# Patient Record
Sex: MALE | Race: WHITE | NOT HISPANIC OR LATINO | Employment: FULL TIME | URBAN - METROPOLITAN AREA
[De-identification: names, ages, dates, MRNs, and addresses within clinical notes are randomized per-mention and may not be internally consistent; named-entity substitution may affect disease eponyms.]

---

## 2023-03-21 ENCOUNTER — APPOINTMENT (OUTPATIENT)
Dept: GENERAL RADIOLOGY | Facility: HOSPITAL | Age: 38
End: 2023-03-21
Payer: COMMERCIAL

## 2023-03-21 ENCOUNTER — HOSPITAL ENCOUNTER (EMERGENCY)
Facility: HOSPITAL | Age: 38
Discharge: HOME | End: 2023-03-22
Attending: EMERGENCY MEDICINE | Admitting: EMERGENCY MEDICINE
Payer: COMMERCIAL

## 2023-03-21 VITALS
HEART RATE: 60 BPM | WEIGHT: 177.47 LBS | SYSTOLIC BLOOD PRESSURE: 103 MMHG | DIASTOLIC BLOOD PRESSURE: 73 MMHG | BODY MASS INDEX: 28.52 KG/M2 | RESPIRATION RATE: 20 BRPM | OXYGEN SATURATION: 93 % | HEIGHT: 66 IN | TEMPERATURE: 97.9 F

## 2023-03-21 DIAGNOSIS — R07.9 CHEST PAIN, UNSPECIFIED TYPE: Primary | ICD-10-CM

## 2023-03-21 LAB
ALBUMIN SERPL-MCNC: 4.6 G/DL (ref 3.5–5.2)
ALBUMIN/GLOB SERPL: 1.7 G/DL
ALP SERPL-CCNC: 132 U/L (ref 39–117)
ALT SERPL W P-5'-P-CCNC: 26 U/L (ref 1–41)
ANION GAP SERPL CALCULATED.3IONS-SCNC: 12.3 MMOL/L (ref 5–15)
AST SERPL-CCNC: 23 U/L (ref 1–40)
BASOPHILS # BLD AUTO: 0.03 10*3/MM3 (ref 0–0.2)
BASOPHILS NFR BLD AUTO: 0.4 % (ref 0–1.5)
BILIRUB SERPL-MCNC: 0.6 MG/DL (ref 0–1.2)
BUN SERPL-MCNC: 18 MG/DL (ref 6–20)
BUN/CREAT SERPL: 20 (ref 7–25)
CALCIUM SPEC-SCNC: 9.4 MG/DL (ref 8.6–10.5)
CHLORIDE SERPL-SCNC: 102 MMOL/L (ref 98–107)
CO2 SERPL-SCNC: 28.7 MMOL/L (ref 22–29)
CREAT SERPL-MCNC: 0.9 MG/DL (ref 0.76–1.27)
DEPRECATED RDW RBC AUTO: 43.8 FL (ref 37–54)
EGFRCR SERPLBLD CKD-EPI 2021: 113.5 ML/MIN/1.73
EOSINOPHIL # BLD AUTO: 0.11 10*3/MM3 (ref 0–0.4)
EOSINOPHIL NFR BLD AUTO: 1.5 % (ref 0.3–6.2)
ERYTHROCYTE [DISTWIDTH] IN BLOOD BY AUTOMATED COUNT: 13.1 % (ref 12.3–15.4)
GEN 5 2HR TROPONIN T REFLEX: <6 NG/L
GLOBULIN UR ELPH-MCNC: 2.7 GM/DL
GLUCOSE SERPL-MCNC: 114 MG/DL (ref 65–99)
HCT VFR BLD AUTO: 46.7 % (ref 37.5–51)
HGB BLD-MCNC: 16.3 G/DL (ref 13–17.7)
HOLD SPECIMEN: NORMAL
HOLD SPECIMEN: NORMAL
IMM GRANULOCYTES # BLD AUTO: 0.02 10*3/MM3 (ref 0–0.05)
IMM GRANULOCYTES NFR BLD AUTO: 0.3 % (ref 0–0.5)
LIPASE SERPL-CCNC: 17 U/L (ref 13–60)
LYMPHOCYTES # BLD AUTO: 1.8 10*3/MM3 (ref 0.7–3.1)
LYMPHOCYTES NFR BLD AUTO: 23.9 % (ref 19.6–45.3)
MAGNESIUM SERPL-MCNC: 2.1 MG/DL (ref 1.6–2.6)
MCH RBC QN AUTO: 31.5 PG (ref 26.6–33)
MCHC RBC AUTO-ENTMCNC: 34.9 G/DL (ref 31.5–35.7)
MCV RBC AUTO: 90.2 FL (ref 79–97)
MONOCYTES # BLD AUTO: 0.65 10*3/MM3 (ref 0.1–0.9)
MONOCYTES NFR BLD AUTO: 8.6 % (ref 5–12)
NEUTROPHILS NFR BLD AUTO: 4.92 10*3/MM3 (ref 1.7–7)
NEUTROPHILS NFR BLD AUTO: 65.3 % (ref 42.7–76)
NRBC BLD AUTO-RTO: 0 /100 WBC (ref 0–0.2)
NT-PROBNP SERPL-MCNC: <36 PG/ML (ref 0–450)
PLATELET # BLD AUTO: 173 10*3/MM3 (ref 140–450)
PMV BLD AUTO: 11.1 FL (ref 6–12)
POTASSIUM SERPL-SCNC: 3.9 MMOL/L (ref 3.5–5.2)
PROT SERPL-MCNC: 7.3 G/DL (ref 6–8.5)
RBC # BLD AUTO: 5.18 10*6/MM3 (ref 4.14–5.8)
SODIUM SERPL-SCNC: 143 MMOL/L (ref 136–145)
TROPONIN T DELTA: NORMAL
TROPONIN T SERPL HS-MCNC: <6 NG/L
WBC NRBC COR # BLD: 7.53 10*3/MM3 (ref 3.4–10.8)
WHOLE BLOOD HOLD COAG: NORMAL
WHOLE BLOOD HOLD SPECIMEN: NORMAL

## 2023-03-21 PROCEDURE — 83690 ASSAY OF LIPASE: CPT | Performed by: EMERGENCY MEDICINE

## 2023-03-21 PROCEDURE — 71045 X-RAY EXAM CHEST 1 VIEW: CPT

## 2023-03-21 PROCEDURE — 93005 ELECTROCARDIOGRAM TRACING: CPT

## 2023-03-21 PROCEDURE — 93005 ELECTROCARDIOGRAM TRACING: CPT | Performed by: EMERGENCY MEDICINE

## 2023-03-21 PROCEDURE — 83880 ASSAY OF NATRIURETIC PEPTIDE: CPT | Performed by: EMERGENCY MEDICINE

## 2023-03-21 PROCEDURE — 83735 ASSAY OF MAGNESIUM: CPT | Performed by: EMERGENCY MEDICINE

## 2023-03-21 PROCEDURE — 84484 ASSAY OF TROPONIN QUANT: CPT | Performed by: EMERGENCY MEDICINE

## 2023-03-21 PROCEDURE — 85025 COMPLETE CBC W/AUTO DIFF WBC: CPT | Performed by: EMERGENCY MEDICINE

## 2023-03-21 PROCEDURE — 84484 ASSAY OF TROPONIN QUANT: CPT

## 2023-03-21 PROCEDURE — 36415 COLL VENOUS BLD VENIPUNCTURE: CPT | Performed by: EMERGENCY MEDICINE

## 2023-03-21 PROCEDURE — 80053 COMPREHEN METABOLIC PANEL: CPT | Performed by: EMERGENCY MEDICINE

## 2023-03-21 PROCEDURE — 99284 EMERGENCY DEPT VISIT MOD MDM: CPT

## 2023-03-21 RX ORDER — ASPIRIN 81 MG/1
324 TABLET, CHEWABLE ORAL ONCE
Status: COMPLETED | OUTPATIENT
Start: 2023-03-21 | End: 2023-03-21

## 2023-03-21 RX ORDER — SODIUM CHLORIDE 0.9 % (FLUSH) 0.9 %
10 SYRINGE (ML) INJECTION AS NEEDED
Status: DISCONTINUED | OUTPATIENT
Start: 2023-03-21 | End: 2023-03-22 | Stop reason: HOSPADM

## 2023-03-21 RX ADMIN — ASPIRIN 81 MG 324 MG: 81 TABLET ORAL at 20:49

## 2023-03-22 NOTE — ED PROVIDER NOTES
"Time: 11:47 PM EDT  Date of encounter:  3/21/2023  Independent Historian/Clinical History and Information was obtained by:   Patient  Chief Complaint   Patient presents with   • Chest Pain     Pt reports precordial chest pain radiating towards sternum progressively worse over the last two months       History is limited by: N/A    History of Present Illness:  Patient is a 37 y.o. year old male who presents to the emergency department for evaluation of chest pain. Patient states he has been experiencing constant chest pain for the past two months that is increasing in severity. He states it is \"subtley getting worse.\" He denies radiation. He reports no aggravating or alleviating factors. He also notes a cough. He denies fever, congestion, vomiting, or diarrhea.    Patient reports no known medical problems. He reports no routine medications.    HPI    Patient Care Team  Primary Care Provider: Provider, No Known    Past Medical History:     No Known Allergies  History reviewed. No pertinent past medical history.  History reviewed. No pertinent surgical history.  History reviewed. No pertinent family history.    Home Medications:  Prior to Admission medications    Not on File        Social History:   Social History     Tobacco Use   • Smoking status: Every Day     Types: Cigarettes   Substance Use Topics   • Alcohol use: Yes   • Drug use: Yes     Types: Marijuana         Review of Systems:  Review of Systems   Constitutional: Negative for chills and fever.   HENT: Negative for congestion, ear pain and sore throat.    Eyes: Negative for pain.   Respiratory: Positive for cough. Negative for chest tightness and shortness of breath.    Cardiovascular: Positive for chest pain.   Gastrointestinal: Negative for abdominal pain, diarrhea, nausea and vomiting.   Genitourinary: Negative for flank pain and hematuria.   Musculoskeletal: Negative for joint swelling.   Skin: Negative for pallor.   Neurological: Negative for seizures " "and headaches.   All other systems reviewed and are negative.       Physical Exam:  /73   Pulse 60   Temp 97.9 °F (36.6 °C)   Resp 20   Ht 167.6 cm (66\")   Wt 80.5 kg (177 lb 7.5 oz)   SpO2 93%   BMI 28.64 kg/m²     Physical Exam  Vitals and nursing note reviewed.   Constitutional:       General: He is not in acute distress.     Appearance: Normal appearance. He is not toxic-appearing.   HENT:      Head: Normocephalic and atraumatic.      Mouth/Throat:      Mouth: Mucous membranes are moist.   Eyes:      General: No scleral icterus.     Extraocular Movements: Extraocular movements intact.      Conjunctiva/sclera: Conjunctivae normal.   Cardiovascular:      Rate and Rhythm: Normal rate and regular rhythm.      Pulses: Normal pulses.      Heart sounds: Normal heart sounds.   Pulmonary:      Effort: Pulmonary effort is normal. No respiratory distress.      Breath sounds: Normal breath sounds.   Chest:      Chest wall: No tenderness.   Abdominal:      General: Abdomen is flat. There is no distension.      Palpations: Abdomen is soft.      Tenderness: There is no abdominal tenderness.   Musculoskeletal:         General: Normal range of motion.      Cervical back: Normal range of motion and neck supple.   Skin:     General: Skin is warm and dry.      Coloration: Skin is not cyanotic.   Neurological:      Mental Status: He is alert and oriented to person, place, and time. Mental status is at baseline.   Psychiatric:         Attention and Perception: Attention and perception normal.         Mood and Affect: Mood normal.                  Procedures:  Procedures      Medical Decision Making:      Comorbidities that affect care:    Smoking    External Notes reviewed:    Previous Clinic Note: Patient seen at urgent care on 2/14/21 for covid test      The following orders were placed and all results were independently analyzed by me:  Orders Placed This Encounter   Procedures   • XR Chest 1 View   • [place] Draw   • " High Sensitivity Troponin T   • Comprehensive Metabolic Panel   • Lipase   • BNP   • Magnesium   • CBC Auto Differential   • High Sensitivity Troponin T 2Hr   • Undress & Gown   • Continuous Pulse Oximetry   • ECG 12 Lead ED Triage Standing Order; Chest Pain   • CBC & Differential   • Green Top (Gel)   • Lavender Top   • Gold Top - SST   • Light Blue Top       Medications Given in the Emergency Department:  Medications   aspirin chewable tablet 324 mg (324 mg Oral Given 3/21/23 2049)        ED Course:    The patient was initially evaluated in the triage area where orders were placed. The patient was later dispositioned by Anny Santacruz MD.      The patient was advised to stay for completion of workup which includes but is not limited to communication of labs and radiological results, reassessment and plan. The patient was advised that leaving prior to disposition by a provider could result in critical findings that are not communicated to the patient.     ED Course as of 03/22/23 0635   Tue Mar 21, 2023   2052 PROVIDER IN TRIAGE  Patient was evaluated by me in triage, Paco Carpenter PA-C.  Orders were placed and patient is currently awaiting final results and disposition.  [MD]   Wed Mar 22, 2023   0012 ECG 12 Lead ED Triage Standing Order; Chest Pain  Normal sinus rhythm with rate of 87. QRS normal. KS interval normal. QTc interval is normal. No ST elevation or depression. No T wave abnormalities. No previous available for comparison. This EKG was interpreted by me.  [LD]   0013 ECG 12 Lead ED Triage Standing Order; Chest Pain  Normal sinus rhythm with rate of 73. QRS normal. KS interval normal. QTc interval is normal. No ST elevation or depression. No T wave abnormalities. No change when compared to tang. This EKG was interpreted by me.  [LD]      ED Course User Index  [LD] Anny Santacruz MD  [MD] Paco Carpenter PA-C       Labs:    Lab Results (last 24 hours)     Procedure Component Value  Units Date/Time    High Sensitivity Troponin T [[id number]]  (Normal) Collected: 03/21/23 2026    Specimen: Blood Updated: 03/21/23 2126     HS Troponin T <6 ng/L     Narrative:      High Sensitive Troponin T Reference Range:  <10.0 ng/L- Negative Female for AMI  <15.0 ng/L- Negative Male for AMI  >=10 - Abnormal Female indicating possible myocardial injury.  >=15 - Abnormal Male indicating possible myocardial injury.   Clinicians would have to utilize clinical acumen, EKG, Troponin, and serial changes to determine if it is an Acute Myocardial Infarction or myocardial injury due to an underlying chronic condition.         CBC & Differential [[id number]]  (Normal) Collected: 03/21/23 2026    Specimen: Blood Updated: 03/21/23 2052    Narrative:      The following orders were created for panel order CBC & Differential.  Procedure                               Abnormality         Status                     ---------                               -----------         ------                     CBC Auto Differential[[id number]]        Normal              Final result                 Please view results for these tests on the individual orders.    Comprehensive Metabolic Panel [[id number]]  (Abnormal) Collected: 03/21/23 2026    Specimen: Blood Updated: 03/21/23 2126     Glucose 114 mg/dL      BUN 18 mg/dL      Creatinine 0.90 mg/dL      Sodium 143 mmol/L      Potassium 3.9 mmol/L      Chloride 102 mmol/L      CO2 28.7 mmol/L      Calcium 9.4 mg/dL      Total Protein 7.3 g/dL      Albumin 4.6 g/dL      ALT (SGPT) 26 U/L      AST (SGOT) 23 U/L      Alkaline Phosphatase 132 U/L      Total Bilirubin 0.6 mg/dL      Globulin 2.7 gm/dL      A/G Ratio 1.7 g/dL      BUN/Creatinine Ratio 20.0     Anion Gap 12.3 mmol/L      eGFR 113.5 mL/min/1.73     Narrative:      GFR Normal >60  Chronic Kidney Disease <60  Kidney Failure <15      Lipase [[id number]]  (Normal) Collected: 03/21/23 2026    Specimen: Blood Updated: 03/21/23 2126     Lipase  17 U/L     BNP [[id number]]  (Normal) Collected: 03/21/23 2026    Specimen: Blood Updated: 03/21/23 2120     proBNP <36.0 pg/mL     Narrative:      Among patients with dyspnea, NT-proBNP is highly sensitive for the detection of acute congestive heart failure. In addition NT-proBNP of <300 pg/ml effectively rules out acute congestive heart failure with 99% negative predictive value.      Magnesium [[id number]]  (Normal) Collected: 03/21/23 2026    Specimen: Blood Updated: 03/21/23 2126     Magnesium 2.1 mg/dL     CBC Auto Differential [[id number]]  (Normal) Collected: 03/21/23 2026    Specimen: Blood Updated: 03/21/23 2052     WBC 7.53 10*3/mm3      RBC 5.18 10*6/mm3      Hemoglobin 16.3 g/dL      Hematocrit 46.7 %      MCV 90.2 fL      MCH 31.5 pg      MCHC 34.9 g/dL      RDW 13.1 %      RDW-SD 43.8 fl      MPV 11.1 fL      Platelets 173 10*3/mm3      Neutrophil % 65.3 %      Lymphocyte % 23.9 %      Monocyte % 8.6 %      Eosinophil % 1.5 %      Basophil % 0.4 %      Immature Grans % 0.3 %      Neutrophils, Absolute 4.92 10*3/mm3      Lymphocytes, Absolute 1.80 10*3/mm3      Monocytes, Absolute 0.65 10*3/mm3      Eosinophils, Absolute 0.11 10*3/mm3      Basophils, Absolute 0.03 10*3/mm3      Immature Grans, Absolute 0.02 10*3/mm3      nRBC 0.0 /100 WBC     High Sensitivity Troponin T 2Hr [[id number]] Collected: 03/21/23 2228    Specimen: Blood Updated: 03/21/23 2302     HS Troponin T <6 ng/L      Troponin T Delta --     Comment: Unable to calculate.       Narrative:      High Sensitive Troponin T Reference Range:  <10.0 ng/L- Negative Female for AMI  <15.0 ng/L- Negative Male for AMI  >=10 - Abnormal Female indicating possible myocardial injury.  >=15 - Abnormal Male indicating possible myocardial injury.   Clinicians would have to utilize clinical acumen, EKG, Troponin, and serial changes to determine if it is an Acute Myocardial Infarction or myocardial injury due to an underlying chronic condition.                 Imaging:    XR Chest 1 View    Result Date: 3/21/2023  PROCEDURE: XR CHEST 1 VW  COMPARISON: 9/15/2015.  INDICATIONS: CHEST PAIN.  FINDINGS:  A single AP (or PA) upright portable chest radiograph was performed.  No cardiac enlargement is seen.  No acute infiltrate is appreciated.  No pleural effusion or pneumothorax is identified.  External artifacts obscure detail.  Chronic calcified granulomatous disease may involve the chest.  No significant interval change is seen since the prior study (or studies).        No acute infiltrate is appreciated.     Please note that portions of this note were completed with a voice recognition program.  ABDIRIZAK SALEEM JR, MD       Electronically Signed and Approved By: ABDIRIZAK SALEEM JR, MD on 3/21/2023 at 23:36                  Differential Diagnosis and Discussion:      Chest Pain:  Based on the patient's signs and symptoms, I considered aortic dissection, myocardial infaction, pulmonary embolism, cardiac tamponade, pericarditis, pneumothorax, musculoskeletal chest pain and other differential diagnosis as an etiology of the patient's chest pain.     All labs were reviewed and interpreted by me.  All X-rays were independently reviewed by me.  EKG was interpreted by me.    MDM  Number of Diagnoses or Management Options  Diagnosis management comments:   Patient is afebrile and nontoxic appearing.   The patient had an EKG that shows no acute changes. Specifically, there are no ST elevations, t-wave changes of concern, delta waves, or rhythm abnormalities warranting admission. The patient was placed on the cardiac monitor and observed with continuous telemetry. The patient has a chest x-ray that is negative for pneumothorax, pneumonia, and is essentially unremarkable. The patient has had two negative troponins on blood draw.      The patient is resting comfortably and feels better, is alert and in no distress. The repeat examination is unremarkable and benign. Electrocardiogram  shows no signs of acute ischemia and the history, exam, diagnostic testing and current condition did not suggest that this patient is having an acute myocardial infarction, significant arrhythmia, unstable angina, esophageal perforation, pulmonary embolism, aortic dissection, severe pneumonia, sepsis for other significant pathology that would warrant further testing, continued ED treatment, admission, cardiology or other specialist consultation at this point. The vital signs have been stable. Heart score places patient at low risk for major acute cardiac event.      The patient's condition is stable and appropriate for discharge. The patient will pursue further outpatient evaluation with the primary care physician, or designated physician or cardiologist. The patient has expressed a clear and thorough understanding and agreed to follow-up as instructed. Discussed return precautions, discharge instructions and answered all their questions.        Amount and/or Complexity of Data Reviewed  Clinical lab tests: reviewed  Tests in the radiology section of CPT®: reviewed  Review and summarize past medical records: yes  Independent visualization of images, tracings, or specimens: yes    Risk of Complications, Morbidity, and/or Mortality  Presenting problems: low  Management options: low             Patient Care Considerations:    CT CHEST: I considered ordering a CT scan of the chest, however O2 sat upper 90s on room air. No shortness of breath      Consultants/Shared Management Plan:    None    Social Determinants of Health:    Patient is independent, reliable, and has access to care.       Disposition and Care Coordination:    Discharged: The patient is suitable and stable for discharge with no need for consideration of observation or admission.    I have explained the patient´s condition, diagnoses and treatment plan based on the information available to me at this time. I have answered questions and addressed any  concerns. The patient has a good  understanding of the patient´s diagnosis, condition, and treatment plan as can be expected at this point. The vital signs have been stable. The patient´s condition is stable and appropriate for discharge from the emergency department.      The patient will pursue further outpatient evaluation with the primary care physician or other designated or consulting physician as outlined in the discharge instructions. They are agreeable to this plan of care and follow-up instructions have been explained in detail. The patient has received these instructions in written format and have expressed an understanding of the discharge instructions. The patient is aware that any significant change in condition or worsening of symptoms should prompt an immediate return to this or the closest emergency department or call to 911.  I have explained discharge medications and the need for follow up with the patient/caretakers. This was also printed in the discharge instructions. Patient was discharged with the following medications and follow up:      Medication List      No changes were made to your prescriptions during this visit.      Provider, No Known  [facility] [id number]    In 2 days         Final diagnoses:   Chest pain, unspecified type        ED Disposition     ED Disposition   Discharge    Condition   Stable    Comment   --             This medical record created using voice recognition software.    Documentation assistance provided by Doe Lal acting as scribe for Anny Santacruz MD. Information recorded by the scribe was done at my direction and has been verified and validated by me.           Doe Lal  03/21/23 5501       Anny Santacruz MD  03/22/23 6421

## 2023-04-04 LAB
QT INTERVAL: 338 MS
QT INTERVAL: 371 MS

## 2023-05-01 ENCOUNTER — TRANSCRIBE ORDERS (OUTPATIENT)
Dept: ADMINISTRATIVE | Facility: HOSPITAL | Age: 38
End: 2023-05-01
Payer: COMMERCIAL

## 2023-05-01 DIAGNOSIS — R07.9 CHEST PAIN, UNSPECIFIED TYPE: Primary | ICD-10-CM

## 2023-09-26 ENCOUNTER — TRANSCRIBE ORDERS (OUTPATIENT)
Dept: ADMINISTRATIVE | Facility: HOSPITAL | Age: 38
End: 2023-09-26
Payer: COMMERCIAL

## 2023-09-26 DIAGNOSIS — R07.9 CHEST PAIN, UNSPECIFIED TYPE: Primary | ICD-10-CM

## 2025-06-01 ENCOUNTER — HOSPITAL ENCOUNTER (EMERGENCY)
Facility: HOSPITAL | Age: 40
Discharge: HOME | End: 2025-06-01
Attending: EMERGENCY MEDICINE | Admitting: EMERGENCY MEDICINE
Payer: MEDICAID

## 2025-06-01 ENCOUNTER — APPOINTMENT (OUTPATIENT)
Dept: CT IMAGING | Facility: HOSPITAL | Age: 40
End: 2025-06-01
Payer: MEDICAID

## 2025-06-01 VITALS
HEART RATE: 85 BPM | RESPIRATION RATE: 16 BRPM | TEMPERATURE: 98 F | OXYGEN SATURATION: 91 % | WEIGHT: 150.13 LBS | HEIGHT: 70 IN | DIASTOLIC BLOOD PRESSURE: 81 MMHG | SYSTOLIC BLOOD PRESSURE: 116 MMHG | BODY MASS INDEX: 21.49 KG/M2

## 2025-06-01 DIAGNOSIS — R19.7 DIARRHEA, UNSPECIFIED TYPE: Primary | ICD-10-CM

## 2025-06-01 LAB
ALBUMIN SERPL-MCNC: 4.5 G/DL (ref 3.5–5.2)
ALBUMIN/GLOB SERPL: 1.6 G/DL
ALP SERPL-CCNC: 117 U/L (ref 39–117)
ALT SERPL W P-5'-P-CCNC: 20 U/L (ref 1–41)
ANION GAP SERPL CALCULATED.3IONS-SCNC: 15.1 MMOL/L (ref 5–15)
AST SERPL-CCNC: 20 U/L (ref 1–40)
BASOPHILS # BLD AUTO: 0.06 10*3/MM3 (ref 0–0.2)
BASOPHILS NFR BLD AUTO: 0.5 % (ref 0–1.5)
BILIRUB SERPL-MCNC: <0.2 MG/DL (ref 0–1.2)
BILIRUB UR QL STRIP: NEGATIVE
BUN SERPL-MCNC: 13.3 MG/DL (ref 6–20)
BUN/CREAT SERPL: 17.7 (ref 7–25)
CALCIUM SPEC-SCNC: 9.1 MG/DL (ref 8.6–10.5)
CHLORIDE SERPL-SCNC: 103 MMOL/L (ref 98–107)
CLARITY UR: CLEAR
CO2 SERPL-SCNC: 20.9 MMOL/L (ref 22–29)
COLOR UR: YELLOW
CREAT SERPL-MCNC: 0.75 MG/DL (ref 0.76–1.27)
DEPRECATED RDW RBC AUTO: 44.5 FL (ref 37–54)
EGFRCR SERPLBLD CKD-EPI 2021: 118.5 ML/MIN/1.73
EOSINOPHIL # BLD AUTO: 0.23 10*3/MM3 (ref 0–0.4)
EOSINOPHIL NFR BLD AUTO: 2.1 % (ref 0.3–6.2)
ERYTHROCYTE [DISTWIDTH] IN BLOOD BY AUTOMATED COUNT: 13.2 % (ref 12.3–15.4)
GLOBULIN UR ELPH-MCNC: 2.8 GM/DL
GLUCOSE SERPL-MCNC: 88 MG/DL (ref 65–99)
GLUCOSE UR STRIP-MCNC: NEGATIVE MG/DL
HCT VFR BLD AUTO: 42.8 % (ref 37.5–51)
HGB BLD-MCNC: 14.2 G/DL (ref 13–17.7)
HGB UR QL STRIP.AUTO: NEGATIVE
HOLD SPECIMEN: NORMAL
HOLD SPECIMEN: NORMAL
IMM GRANULOCYTES # BLD AUTO: 0.05 10*3/MM3 (ref 0–0.05)
IMM GRANULOCYTES NFR BLD AUTO: 0.5 % (ref 0–0.5)
KETONES UR QL STRIP: NEGATIVE
LEUKOCYTE ESTERASE UR QL STRIP.AUTO: NEGATIVE
LIPASE SERPL-CCNC: 30 U/L (ref 13–60)
LYMPHOCYTES # BLD AUTO: 2.9 10*3/MM3 (ref 0.7–3.1)
LYMPHOCYTES NFR BLD AUTO: 26.2 % (ref 19.6–45.3)
MCH RBC QN AUTO: 30.7 PG (ref 26.6–33)
MCHC RBC AUTO-ENTMCNC: 33.2 G/DL (ref 31.5–35.7)
MCV RBC AUTO: 92.4 FL (ref 79–97)
MONOCYTES # BLD AUTO: 1.08 10*3/MM3 (ref 0.1–0.9)
MONOCYTES NFR BLD AUTO: 9.8 % (ref 5–12)
NEUTROPHILS NFR BLD AUTO: 6.73 10*3/MM3 (ref 1.7–7)
NEUTROPHILS NFR BLD AUTO: 60.9 % (ref 42.7–76)
NITRITE UR QL STRIP: NEGATIVE
NRBC BLD AUTO-RTO: 0 /100 WBC (ref 0–0.2)
PH UR STRIP.AUTO: 5.5 [PH] (ref 5–8)
PLATELET # BLD AUTO: 247 10*3/MM3 (ref 140–450)
PMV BLD AUTO: 10.8 FL (ref 6–12)
POTASSIUM SERPL-SCNC: 4.4 MMOL/L (ref 3.5–5.2)
PROT SERPL-MCNC: 7.3 G/DL (ref 6–8.5)
PROT UR QL STRIP: NEGATIVE
RBC # BLD AUTO: 4.63 10*6/MM3 (ref 4.14–5.8)
SODIUM SERPL-SCNC: 139 MMOL/L (ref 136–145)
SP GR UR STRIP: 1.01 (ref 1–1.03)
UROBILINOGEN UR QL STRIP: NORMAL
WBC NRBC COR # BLD AUTO: 11.05 10*3/MM3 (ref 3.4–10.8)
WHOLE BLOOD HOLD COAG: NORMAL
WHOLE BLOOD HOLD SPECIMEN: NORMAL

## 2025-06-01 PROCEDURE — 74177 CT ABD & PELVIS W/CONTRAST: CPT

## 2025-06-01 PROCEDURE — 80053 COMPREHEN METABOLIC PANEL: CPT | Performed by: EMERGENCY MEDICINE

## 2025-06-01 PROCEDURE — 99285 EMERGENCY DEPT VISIT HI MDM: CPT

## 2025-06-01 PROCEDURE — 36415 COLL VENOUS BLD VENIPUNCTURE: CPT

## 2025-06-01 PROCEDURE — 85025 COMPLETE CBC W/AUTO DIFF WBC: CPT | Performed by: EMERGENCY MEDICINE

## 2025-06-01 PROCEDURE — 81003 URINALYSIS AUTO W/O SCOPE: CPT | Performed by: EMERGENCY MEDICINE

## 2025-06-01 PROCEDURE — 83690 ASSAY OF LIPASE: CPT | Performed by: EMERGENCY MEDICINE

## 2025-06-01 PROCEDURE — 25510000001 IOPAMIDOL PER 1 ML: Performed by: EMERGENCY MEDICINE

## 2025-06-01 RX ORDER — IOPAMIDOL 755 MG/ML
100 INJECTION, SOLUTION INTRAVASCULAR
Status: COMPLETED | OUTPATIENT
Start: 2025-06-01 | End: 2025-06-01

## 2025-06-01 RX ORDER — SODIUM CHLORIDE 0.9 % (FLUSH) 0.9 %
10 SYRINGE (ML) INJECTION AS NEEDED
Status: DISCONTINUED | OUTPATIENT
Start: 2025-06-01 | End: 2025-06-01 | Stop reason: HOSPADM

## 2025-06-01 RX ADMIN — IOPAMIDOL 100 ML: 755 INJECTION, SOLUTION INTRAVENOUS at 13:00

## 2025-06-01 NOTE — ED PROVIDER NOTES
"SHARED VISIT ATTESTATION:    This visit was performed by myself and an APC.  I personally approved the management plan/medical decision making and take responsibility for the patient management.      SHARED VISIT NOTE:    Patient is 39 y.o. year old male that presents to the ED for evaluation of diarrhea and some rectal bleeding this morning.  No nausea or vomiting.  He does have a hemorrhoid.  No abdominal cramping now..     Physical Exam    ED Course:    /81   Pulse 85   Temp 98 °F (36.7 °C) (Oral)   Resp 16   Ht 177.8 cm (70\")   Wt 68.1 kg (150 lb 2.1 oz)   SpO2 91%   BMI 21.54 kg/m²       The following orders were placed and all results were independently analyzed by me:  Orders Placed This Encounter   Procedures    CT Abdomen Pelvis With Contrast    [place] Draw    Comprehensive Metabolic Panel    Lipase    Urinalysis With Microscopic If Indicated (No Culture) - Urine, Clean Catch    CBC Auto Differential    NPO Diet NPO Type: Strict NPO    Undress & Gown    Insert Peripheral IV    CBC & Differential    Green Top (Gel)    Lavender Top    Gold Top - SST    Light Blue Top       Medications Given in the Emergency Department:  Medications   sodium chloride 0.9 % flush 10 mL (has no administration in time range)   iopamidol (ISOVUE-370) 76 % injection 100 mL (100 mL Intravenous Given 6/1/25 1300)        ED Course:         Labs:    Lab Results (last 24 hours)       Procedure Component Value Units Date/Time    CBC & Differential [[id number]]  (Abnormal) Collected: 06/01/25 1152    Specimen: Blood from Arm, Right Updated: 06/01/25 1158    Narrative:      The following orders were created for panel order CBC & Differential.  Procedure                               Abnormality         Status                     ---------                               -----------         ------                     CBC Auto Differential[[id number]]        Abnormal            Final result                 Please view results for " these tests on the individual orders.    Comprehensive Metabolic Panel [[id number]]  (Abnormal) Collected: 06/01/25 1152    Specimen: Blood from Arm, Right Updated: 06/01/25 1234     Glucose 88 mg/dL      BUN 13.3 mg/dL      Creatinine 0.75 mg/dL      Sodium 139 mmol/L      Potassium 4.4 mmol/L      Comment: Slight hemolysis detected by analyzer. Result may be falsely elevated.        Chloride 103 mmol/L      CO2 20.9 mmol/L      Calcium 9.1 mg/dL      Total Protein 7.3 g/dL      Albumin 4.5 g/dL      ALT (SGPT) 20 U/L      AST (SGOT) 20 U/L      Comment: Slight hemolysis detected by analyzer. Result may be falsely elevated.        Alkaline Phosphatase 117 U/L      Total Bilirubin <0.2 mg/dL      Globulin 2.8 gm/dL      A/G Ratio 1.6 g/dL      BUN/Creatinine Ratio 17.7     Anion Gap 15.1 mmol/L      eGFR 118.5 mL/min/1.73     Narrative:      GFR Categories in Chronic Kidney Disease (CKD)              GFR Category          GFR (mL/min/1.73)    Interpretation  G1                    90 or greater        Normal or high (1)  G2                    60-89                Mild decrease (1)  G3a                   45-59                Mild to moderate decrease  G3b                   30-44                Moderate to severe decrease  G4                    15-29                Severe decrease  G5                    14 or less           Kidney failure    (1)In the absence of evidence of kidney disease, neither GFR category G1 or G2 fulfill the criteria for CKD.    eGFR calculation 2021 CKD-EPI creatinine equation, which does not include race as a factor    Lipase [[id number]]  (Normal) Collected: 06/01/25 1152    Specimen: Blood from Arm, Right Updated: 06/01/25 1233     Lipase 30 U/L     CBC Auto Differential [[id number]]  (Abnormal) Collected: 06/01/25 1152    Specimen: Blood from Arm, Right Updated: 06/01/25 1158     WBC 11.05 10*3/mm3      RBC 4.63 10*6/mm3      Hemoglobin 14.2 g/dL      Hematocrit 42.8 %      MCV 92.4 fL      MCH  30.7 pg      MCHC 33.2 g/dL      RDW 13.2 %      RDW-SD 44.5 fl      MPV 10.8 fL      Platelets 247 10*3/mm3      Neutrophil % 60.9 %      Lymphocyte % 26.2 %      Monocyte % 9.8 %      Eosinophil % 2.1 %      Basophil % 0.5 %      Immature Grans % 0.5 %      Neutrophils, Absolute 6.73 10*3/mm3      Lymphocytes, Absolute 2.90 10*3/mm3      Monocytes, Absolute 1.08 10*3/mm3      Eosinophils, Absolute 0.23 10*3/mm3      Basophils, Absolute 0.06 10*3/mm3      Immature Grans, Absolute 0.05 10*3/mm3      nRBC 0.0 /100 WBC     Urinalysis With Microscopic If Indicated (No Culture) - Urine, Clean Catch [[id number]]  (Normal) Collected: 06/01/25 1309    Specimen: Urine, Clean Catch Updated: 06/01/25 1318     Color, UA Yellow     Appearance, UA Clear     pH, UA 5.5     Specific Gravity, UA 1.006     Glucose, UA Negative     Ketones, UA Negative     Bilirubin, UA Negative     Blood, UA Negative     Protein, UA Negative     Leuk Esterase, UA Negative     Nitrite, UA Negative     Urobilinogen, UA 0.2 E.U./dL    Narrative:      Urine microscopic not indicated.             Imaging:    CT Abdomen Pelvis With Contrast  Result Date: 6/1/2025  CT ABDOMEN PELVIS W CONTRAST Date of Exam: 6/1/2025 12:45 PM EDT Indication: Patient with watery diarrhea this morning, feels like he has a blockage.  Rectal bleed. Comparison: Acute abdomen series 7/29/2014 Technique: Axial CT images were obtained of the abdomen and pelvis after the uneventful intravenous administration of iodinated contrast. Reconstructed coronal and sagittal images were also obtained. Automated exposure control and iterative construction methods were used. Findings: No infiltrate in the visualized lower lungs. No pleural or pericardial effusion. Heart size appears within the limits. Liver, spleen, adrenal glands, and pancreas are without evidence of acute abnormality. Subcentimeter hypodensity in the right hepatic lobe on image 60 is too small to accurately characterize, but  statistically most likely a small cyst or hemangioma. Gallbladder is nondistended limiting assessment. No definite pericholecystic inflammation or bili ductal dilatation. No definite obstructing ureteral calculus. Moderate distention of the urinary bladder. Mild distention of the bilateral renal collecting systems. No definite renal calculus. Subcentimeter fluid attenuating lesion at the upper pole of the left kidney cap most likely a small cyst. No definite suspicious bladder abnormality. Aorta appears normal in caliber. No abdominal lymphadenopathy. No definite acute gastric abnormality. No definite small bowel dilatation. The appendix appears partially gas-filled without definite inflammatory changes. Gaseous distention of the colon. No definite acute colonic or rectal inflammation. No ectopic bowel gas. Prostate does not appear enlarged. No significant free fluid. No definite pelvic lymphadenopathy. No acute osseous abnormality.     Impression: 1.Moderate distention of the urinary bladder with mild distention of the renal collecting systems without visible obstructing etiology. Correlate for urinary retention. 2.No other definite acute abdominal or pelvic abnormality is identified at this time. Electronically Signed: Fer Head  6/1/2025 1:25 PM EDT  Workstation ID: [id number]      MDM:    Procedures    Labs were collected in the emergency department and all labs were reviewed and interpreted by me.  CT scan was performed in the emergency department and the CT scan radiology impression was interpreted by me.                     Ole Singleton MD  14:22 EDT  06/01/25         Ole Singleton MD  06/01/25 2368

## 2025-06-01 NOTE — DISCHARGE INSTRUCTIONS
You presented to the emergency department today with diarrhea since this morning, and 1 episode of bloody diarrhea.  He reported that you had a hemorrhoid prior to the last episode, but you believe that the hemorrhoid burst

## 2025-06-01 NOTE — ED PROVIDER NOTES
Time: 12:21 PM EDT  Date of encounter:  6/1/2025  Independent Historian/Clinical History and Information was obtained by:   Patient    History is limited by: N/A    Chief Complaint: Diarrhea since this morning with last episode having bright red blood      History of Present Illness:  Patient is a 39 y.o. year old male who presents to the emergency department for evaluation of diarrhea since this morning, and bright red bloody stool.  Patient reports that he awoke at 330 this morning with some diarrhea, denies bleeding at that time.  States he woke again at 430 or 5, with diarrhea and a known hemorrhoid.  After his diarrhea bowel movement at this time his hemorrhoid, was flat.  He states that he believes that it burst.  Denies abdominal cramping.  States that he believes he may have some kind of a blockage.  Denies nausea /vomiting.      Patient Care Team  Primary Care Provider: Provider, No Known    Past Medical History:     Allergies   Allergen Reactions    Latex Rash     History reviewed. No pertinent past medical history.  History reviewed. No pertinent surgical history.  History reviewed. No pertinent family history.    Home Medications:  Prior to Admission medications    Not on File        Social History:   Social History     Tobacco Use    Smoking status: Every Day     Types: Cigarettes   Substance Use Topics    Alcohol use: Yes    Drug use: Yes     Types: Marijuana         Review of Systems:  Review of Systems   Constitutional:  Negative for chills and fever.   HENT:  Negative for congestion, rhinorrhea and sore throat.    Eyes:  Negative for pain and visual disturbance.   Respiratory:  Negative for apnea, cough, chest tightness and shortness of breath.    Cardiovascular:  Negative for chest pain and palpitations.   Gastrointestinal:  Positive for blood in stool (Bright red blood with last diarrhea bowel movement) and diarrhea (Diarrhea since this morning). Negative for abdominal distention, abdominal pain,  "anal bleeding, constipation, nausea, rectal pain and vomiting.   Genitourinary:  Negative for difficulty urinating and dysuria.   Musculoskeletal:  Negative for joint swelling and myalgias.   Skin:  Negative for color change.   Neurological:  Negative for seizures and headaches.   Psychiatric/Behavioral: Negative.     All other systems reviewed and are negative.       Physical Exam:  /81   Pulse 86   Temp 98.2 °F (36.8 °C) (Oral)   Resp 18   Ht 177.8 cm (70\")   Wt 68.1 kg (150 lb 2.1 oz)   SpO2 98%   BMI 21.54 kg/m²     Physical Exam  Vitals and nursing note reviewed.   Constitutional:       General: He is not in acute distress.     Appearance: Normal appearance. He is not toxic-appearing.   HENT:      Head: Normocephalic and atraumatic.      Jaw: There is normal jaw occlusion.   Eyes:      General: Lids are normal.      Extraocular Movements: Extraocular movements intact.      Conjunctiva/sclera: Conjunctivae normal.      Pupils: Pupils are equal, round, and reactive to light.   Cardiovascular:      Rate and Rhythm: Normal rate and regular rhythm.      Pulses: Normal pulses.      Heart sounds: Normal heart sounds.   Pulmonary:      Effort: Pulmonary effort is normal. No respiratory distress.      Breath sounds: Normal breath sounds. No wheezing or rhonchi.   Abdominal:      General: Abdomen is flat. There is no distension.      Palpations: Abdomen is soft. There is no mass.      Tenderness: There is no abdominal tenderness. There is no right CVA tenderness, left CVA tenderness, guarding or rebound.      Hernia: No hernia is present.      Comments: Patient refused rectal exam, he states he is certain that he had a hemorrhoid that burst, no active bleeding at this time.   Musculoskeletal:         General: Normal range of motion.      Cervical back: Normal range of motion and neck supple.      Right lower leg: No edema.      Left lower leg: No edema.   Skin:     General: Skin is warm and dry. "   Neurological:      Mental Status: He is alert and oriented to person, place, and time. Mental status is at baseline.   Psychiatric:         Mood and Affect: Mood normal.                    Medical Decision Making:      Comorbidities that affect care:    None    External Notes reviewed:    None      The following orders were placed and all results were independently analyzed by me:  Orders Placed This Encounter   Procedures    CT Abdomen Pelvis With Contrast    Lamar Draw    Comprehensive Metabolic Panel    Lipase    Urinalysis With Microscopic If Indicated (No Culture) - Urine, Clean Catch    CBC Auto Differential    NPO Diet NPO Type: Strict NPO    Undress & Gown    Insert Peripheral IV    CBC & Differential    Green Top (Gel)    Lavender Top    Gold Top - SST    Light Blue Top       Medications Given in the Emergency Department:  Medications   sodium chloride 0.9 % flush 10 mL (has no administration in time range)   iopamidol (ISOVUE-370) 76 % injection 100 mL (100 mL Intravenous Given 6/1/25 1300)        ED Course:         Labs:    Lab Results (last 24 hours)       Procedure Component Value Units Date/Time    CBC & Differential [[id number]]  (Abnormal) Collected: 06/01/25 1152    Specimen: Blood from Arm, Right Updated: 06/01/25 1158    Narrative:      The following orders were created for panel order CBC & Differential.  Procedure                               Abnormality         Status                     ---------                               -----------         ------                     CBC Auto Differential[[id number]]        Abnormal            Final result                 Please view results for these tests on the individual orders.    Comprehensive Metabolic Panel [[id number]]  (Abnormal) Collected: 06/01/25 1152    Specimen: Blood from Arm, Right Updated: 06/01/25 1234     Glucose 88 mg/dL      BUN 13.3 mg/dL      Creatinine 0.75 mg/dL      Sodium 139 mmol/L      Potassium 4.4 mmol/L      Comment:  Slight hemolysis detected by analyzer. Result may be falsely elevated.        Chloride 103 mmol/L      CO2 20.9 mmol/L      Calcium 9.1 mg/dL      Total Protein 7.3 g/dL      Albumin 4.5 g/dL      ALT (SGPT) 20 U/L      AST (SGOT) 20 U/L      Comment: Slight hemolysis detected by analyzer. Result may be falsely elevated.        Alkaline Phosphatase 117 U/L      Total Bilirubin <0.2 mg/dL      Globulin 2.8 gm/dL      A/G Ratio 1.6 g/dL      BUN/Creatinine Ratio 17.7     Anion Gap 15.1 mmol/L      eGFR 118.5 mL/min/1.73     Narrative:      GFR Categories in Chronic Kidney Disease (CKD)              GFR Category          GFR (mL/min/1.73)    Interpretation  G1                    90 or greater        Normal or high (1)  G2                    60-89                Mild decrease (1)  G3a                   45-59                Mild to moderate decrease  G3b                   30-44                Moderate to severe decrease  G4                    15-29                Severe decrease  G5                    14 or less           Kidney failure    (1)In the absence of evidence of kidney disease, neither GFR category G1 or G2 fulfill the criteria for CKD.    eGFR calculation 2021 CKD-EPI creatinine equation, which does not include race as a factor    Lipase [[id number]]  (Normal) Collected: 06/01/25 1152    Specimen: Blood from Arm, Right Updated: 06/01/25 1233     Lipase 30 U/L     CBC Auto Differential [[id number]]  (Abnormal) Collected: 06/01/25 1152    Specimen: Blood from Arm, Right Updated: 06/01/25 1158     WBC 11.05 10*3/mm3      RBC 4.63 10*6/mm3      Hemoglobin 14.2 g/dL      Hematocrit 42.8 %      MCV 92.4 fL      MCH 30.7 pg      MCHC 33.2 g/dL      RDW 13.2 %      RDW-SD 44.5 fl      MPV 10.8 fL      Platelets 247 10*3/mm3      Neutrophil % 60.9 %      Lymphocyte % 26.2 %      Monocyte % 9.8 %      Eosinophil % 2.1 %      Basophil % 0.5 %      Immature Grans % 0.5 %      Neutrophils, Absolute 6.73 10*3/mm3       Lymphocytes, Absolute 2.90 10*3/mm3      Monocytes, Absolute 1.08 10*3/mm3      Eosinophils, Absolute 0.23 10*3/mm3      Basophils, Absolute 0.06 10*3/mm3      Immature Grans, Absolute 0.05 10*3/mm3      nRBC 0.0 /100 WBC     Urinalysis With Microscopic If Indicated (No Culture) - Urine, Clean Catch [[id number]]  (Normal) Collected: 06/01/25 1309    Specimen: Urine, Clean Catch Updated: 06/01/25 1318     Color, UA Yellow     Appearance, UA Clear     pH, UA 5.5     Specific Gravity, UA 1.006     Glucose, UA Negative     Ketones, UA Negative     Bilirubin, UA Negative     Blood, UA Negative     Protein, UA Negative     Leuk Esterase, UA Negative     Nitrite, UA Negative     Urobilinogen, UA 0.2 E.U./dL    Narrative:      Urine microscopic not indicated.             Imaging:    CT Abdomen Pelvis With Contrast  Result Date: 6/1/2025  CT ABDOMEN PELVIS W CONTRAST Date of Exam: 6/1/2025 12:45 PM EDT Indication: Patient with watery diarrhea this morning, feels like he has a blockage.  Rectal bleed. Comparison: Acute abdomen series 7/29/2014 Technique: Axial CT images were obtained of the abdomen and pelvis after the uneventful intravenous administration of iodinated contrast. Reconstructed coronal and sagittal images were also obtained. Automated exposure control and iterative construction methods were used. Findings: No infiltrate in the visualized lower lungs. No pleural or pericardial effusion. Heart size appears within the limits. Liver, spleen, adrenal glands, and pancreas are without evidence of acute abnormality. Subcentimeter hypodensity in the right hepatic lobe on image 60 is too small to accurately characterize, but statistically most likely a small cyst or hemangioma. Gallbladder is nondistended limiting assessment. No definite pericholecystic inflammation or bili ductal dilatation. No definite obstructing ureteral calculus. Moderate distention of the urinary bladder. Mild distention of the bilateral renal  collecting systems. No definite renal calculus. Subcentimeter fluid attenuating lesion at the upper pole of the left kidney cap most likely a small cyst. No definite suspicious bladder abnormality. Aorta appears normal in caliber. No abdominal lymphadenopathy. No definite acute gastric abnormality. No definite small bowel dilatation. The appendix appears partially gas-filled without definite inflammatory changes. Gaseous distention of the colon. No definite acute colonic or rectal inflammation. No ectopic bowel gas. Prostate does not appear enlarged. No significant free fluid. No definite pelvic lymphadenopathy. No acute osseous abnormality.     Impression: 1.Moderate distention of the urinary bladder with mild distention of the renal collecting systems without visible obstructing etiology. Correlate for urinary retention. 2.No other definite acute abdominal or pelvic abnormality is identified at this time. Electronically Signed: Fer Head  6/1/2025 1:25 PM EDT  Workstation ID: [id number]        Differential Diagnosis and Discussion:    Diarrhea: Differential diagnosis includes but is not limited to malabsorption syndrome, bacterial infection, carcinoid syndrome, pancreatic hypersecretion, viral infection, celiac sprue, Crohn's disease, ulcerative colitis, ischemic colitis, colitis, hypermotility, and irritable bowel syndrome.    PROCEDURES:    Labs were collected in the emergency department and all labs were reviewed and interpreted by me.  CT scan was performed in the emergency department and the CT scan radiology impression was interpreted by me.    No orders to display       Procedures    MDM     Amount and/or Complexity of Data Reviewed  Clinical lab tests: reviewed      I have explained the patient´s condition, diagnoses and treatment plan based on the information available to me at this time. I have answered questions and addressed any concerns. The patient has a good  understanding of the patient´s  diagnosis, condition, and treatment plan as can be expected at this point. The vital signs have been stable. The patient´s condition is stable and appropriate for discharge from the emergency department.     The patient´s CBC that was reviewed and interpreted by me shows no abnormalities of critical concern. Of note, there is no anemia requiring a blood transfusion and the platelet count is acceptable.    The patient´s CMP that was reviewed and interpretted by me shows no abnormalities of critical concern. Of note, the patient´s sodium and potassium are acceptable. The patient´s liver enzymes are unremarkable. The patient´s renal function (creatinine) is preserved. The patient has a normal anion gap.    UA was negative, CT of abdomen and pelvis shows no acute abdomen or pelvic abnormality at this time.     The patient will pursue further outpatient evaluation with the primary care physician or other designated or consulting physician as outlined in the discharge instructions. They are agreeable to this plan of care and follow-up instructions have been explained in detail. The patient has received these instructions in written format and has expressed an understanding of the discharge instructions. The patient is aware that any significant change in condition or worsening of symptoms should prompt an immediate return to this or the closest emergency department or call to 911.                 Patient Care Considerations:    SEPSIS was considered but is NOT present in the emergency department as SIRS criteria is not present.      Consultants/Shared Management Plan:    None    Social Determinants of Health:    Patient is independent, reliable, and has access to care.       Disposition and Care Coordination:    Discharged: The patient is suitable and stable for discharge with no need for consideration of admission.    I have explained discharge medications and the need for follow up with the patient/caretakers. This was  also printed in the discharge instructions. Patient was discharged with the following medications and follow up:      Medication List      No changes were made to your prescriptions during this visit.      Provider, No Known  [facility]  [place] [address]    In 1 week      Provider, No Known  [facility]  [place] [address]    In 1 week         Final diagnoses:   Diarrhea, unspecified type        ED Disposition       ED Disposition   Discharge    Condition   Stable    Comment   --               This medical record created using voice recognition software.             Leigh Ann Mcpherson, MIGUEL  06/01/25 3904

## 2025-07-18 ENCOUNTER — HOSPITAL ENCOUNTER (EMERGENCY)
Facility: HOSPITAL | Age: 40
Discharge: HOME | End: 2025-07-18
Attending: EMERGENCY MEDICINE
Payer: MEDICAID

## 2025-07-18 VITALS
DIASTOLIC BLOOD PRESSURE: 55 MMHG | RESPIRATION RATE: 20 BRPM | OXYGEN SATURATION: 92 % | WEIGHT: 174 LBS | TEMPERATURE: 97.4 F | HEART RATE: 77 BPM | SYSTOLIC BLOOD PRESSURE: 115 MMHG | BODY MASS INDEX: 28.99 KG/M2 | HEIGHT: 65 IN

## 2025-07-18 DIAGNOSIS — S01.511A LACERATION OF LOWER LIP, INITIAL ENCOUNTER: Primary | ICD-10-CM

## 2025-07-18 PROCEDURE — 99283 EMERGENCY DEPT VISIT LOW MDM: CPT

## 2025-07-18 RX ORDER — QUETIAPINE FUMARATE 100 MG/1
TABLET, FILM COATED ORAL
COMMUNITY
Start: 2025-06-26

## 2025-07-18 NOTE — DISCHARGE INSTRUCTIONS
Rinse the mouth after every meal.  Soft bland foods until sutures are removed.  Stay hydrated.  Please take antibiotics as prescribed until finished.  Have also put a referral for Pap plastic surgery.  I have given you their contact information as well.    Sutures will need to be removed in 3 to 5 days

## 2025-07-18 NOTE — ED PROVIDER NOTES
"SHARED VISIT ATTESTATION:    This visit was performed by myself and an APC.  I personally approved the management plan/medical decision making and take responsibility for the patient management.      SHARED VISIT NOTE:    Patient is 39 y.o. year old male that presents to the ED for evaluation of lip laceration.     Physical Exam    ED Course:    /55   Pulse 77   Temp 97.4 °F (36.3 °C) (Oral)   Resp 20   Ht 165.1 cm (65\")   Wt 78.9 kg (174 lb)   SpO2 92%   BMI 28.96 kg/m²       The following orders were placed and all results were independently analyzed by me:  Orders Placed This Encounter   Procedures    Laceration Repair    Ambulatory Referral to Plastic Surgery    Inpatient SANE Consult       Medications Given in the Emergency Department:  Medications - No data to display     ED Course:         Labs:    Lab Results (last 24 hours)       ** No results found for the last 24 hours. **             Imaging:    No Radiology Exams Resulted Within Past 24 Hours    MDM:    Procedures                         Andrea Bridges MD  02:19 EDT  07/18/25         Andrea Bridges MD  07/18/25 0219    "
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
